# Patient Record
Sex: MALE | Race: WHITE | URBAN - METROPOLITAN AREA
[De-identification: names, ages, dates, MRNs, and addresses within clinical notes are randomized per-mention and may not be internally consistent; named-entity substitution may affect disease eponyms.]

---

## 2015-09-08 LAB
CREAT SERPL-MCNC: 0.82 MG/DL (ref 0.66–1.25)
GFR SERPL CREATININE-BSD FRML MDRD: >60 ML/MIN/1.73M2
GLUCOSE SERPL-MCNC: 96 MG/DL (ref 70–180)
POTASSIUM SERPL-SCNC: 3.8 MMOL/L (ref 3.5–5.3)

## 2017-02-22 ENCOUNTER — TRANSFERRED RECORDS (OUTPATIENT)
Dept: HEALTH INFORMATION MANAGEMENT | Facility: CLINIC | Age: 37
End: 2017-02-22

## 2017-07-13 ENCOUNTER — OFFICE VISIT (OUTPATIENT)
Dept: FAMILY MEDICINE | Facility: CLINIC | Age: 37
End: 2017-07-13
Payer: COMMERCIAL

## 2017-07-13 VITALS
HEART RATE: 68 BPM | OXYGEN SATURATION: 100 % | HEIGHT: 69 IN | WEIGHT: 167 LBS | BODY MASS INDEX: 24.73 KG/M2 | TEMPERATURE: 98.2 F | DIASTOLIC BLOOD PRESSURE: 80 MMHG | SYSTOLIC BLOOD PRESSURE: 123 MMHG

## 2017-07-13 DIAGNOSIS — M25.572 LEFT ANKLE PAIN, UNSPECIFIED CHRONICITY: ICD-10-CM

## 2017-07-13 DIAGNOSIS — R22.9 SKIN LUMPS: Primary | ICD-10-CM

## 2017-07-13 PROCEDURE — 99213 OFFICE O/P EST LOW 20 MIN: CPT | Performed by: FAMILY MEDICINE

## 2017-07-13 NOTE — PATIENT INSTRUCTIONS
1. Home exercises as we discussed.    2. 600-800 mg of Ibuprofen about one hour before exercise.    3. If not better in 4-6 weeks, set up physical therapy - [phone].    4. If still not better 4 weeks later, set up podiatry appointment - [phone].    5. I think the skin lumps are lipomas - if they cause symptoms, get bigger let me know. If you are worried - set up a 40 min appointment to remove one of them.    6. Send me copies of your blood work from your employer.    7. If all is fine, see you in one year for a physical.

## 2017-07-13 NOTE — MR AVS SNAPSHOT
After Visit Summary   7/13/2017    Niles Payne    MRN: [id number]           Patient Information     Date Of Birth          1980        Visit Information        Provider Department      7/13/2017 12:10 PM Alfred Chen MD [facility]        Today's Diagnoses     Skin lumps    -  1    Left ankle pain, unspecified chronicity          Care Instructions    1. Home exercises as we discussed.    2. 600-800 mg of Ibuprofen about one hour before exercise.    3. If not better in 4-6 weeks, set up physical therapy - [phone].    4. If still not better 4 weeks later, set up podiatry appointment - [phone].    5. I think the skin lumps are lipomas - if they cause symptoms, get bigger let me know. If you are worried - set up a 40 min appointment to remove one of them.    6. Send me copies of your blood work from your employer.    7. If all is fine, see you in one year for a physical.          Follow-ups after your visit        Additional Services     [facility] PT, HAND, AND CHIROPRACTIC REFERRAL       === This order will print in the [facility] Scheduling  Office ===    Physical therapy, hand therapy and chiropractic care are available through:    [facility]  [facility]    Call one easy number to schedule at any of the above locations:  [phone].    Your provider has referred you to Physical Therapy at [facility] or [facility]    Indication/Reason for Referral: Ankle Pain  Onset of Illness:  Feb 2017  Therapy Orders:  Evaluate and Treat  Special Programs:  None  Special Request:  None    Additional Comments for the therapist or chiropractor:  Please don't call him - he will call instead.    Please be aware that coverage of these services is subject to the terms and limitations of your health insurance plan.  Call member services at your health plan with any benefit or coverage questions.      Please bring the following to your  "appointment:    >>   Your personal calendar for scheduling future appointments  >>   Comfortable clothing            PODIATRY/FOOT & ANKLE SURGERY REFERRAL       Your provider has referred you to: [facility]: [facility] [phone]   [email or web address]    Please be aware that coverage of these services is subject to the terms and limitations of your health insurance plan.  Call member services at your health plan with any benefit or coverage questions.      Please bring the following to your appointment:  >>   Any x-rays, CTs or MRIs which have been performed.  Contact the facility where they were done to arrange for  prior to your scheduled appointment.    >>   List of current medications   >>   This referral request   >>   Any documents/labs given to you for this referral                  Who to contact     If you have questions or need follow up information about today's clinic visit or your schedule please contact [facility] directly at [phone].  Normal or non-critical lab and imaging results will be communicated to you by MyChart, letter or phone within 4 business days after the clinic has received the results. If you do not hear from us within 7 days, please contact the clinic through [employer] or phone. If you have a critical or abnormal lab result, we will notify you by phone as soon as possible.  Submit refill requests through [employer] or call your pharmacy and they will forward the refill request to us. Please allow 3 business days for your refill to be completed.          Additional Information About Your Visit        [employer] Information     [employer] lets you send messages to your doctor, view your test results, renew your prescriptions, schedule appointments and more. To sign up, go to [email or web address] . Click on \"Log in\" on the left side of the screen, which will take you to the Welcome page. Then click on \"Sign up Now\" on the right " "side of the page.     You will be asked to enter the access code listed below, as well as some personal information. Please follow the directions to create your username and password.     Your access code is: [email or web address]  Expires: 10/11/2017 12:48 PM     Your access code will  in 90 days. If you need help or a new code, please call your [place] clinic or [phone].        Care EveryWhere ID     This is your Care EveryWhere ID. This could be used by other organizations to access your [place] medical records  [id number]        Your Vitals Were     Pulse Temperature Height Pulse Oximetry BMI (Body Mass Index)       68 98.2  F (36.8  C) (Oral) 5' 9\" (1.753 m) 100% 24.66 kg/m2        Blood Pressure from Last 3 Encounters:   17 123/80   13 126/75   11 127/73    Weight from Last 3 Encounters:   17 167 lb (75.8 kg)   13 172 lb (78 kg)   04/26/10 172 lb (78 kg)              We Performed the Following     [facility] PT, HAND, AND CHIROPRACTIC REFERRAL     PODIATRY/FOOT & ANKLE SURGERY REFERRAL        Primary Care Provider    [facility] Other Clinic                Equal Access to Services     TEVIN BAZAN : Hadii anthony ku hadasho Soyinkaali, waaxda luqadaha, qaybta kaalmada adeegyada, waxay gabriella voss . So [facility] [phone].    ATENCIÓN: Si habla español, tiene a russell disposición servicios gratuitos de asistencia lingüística. Llame al [phone].    We comply with applicable federal civil rights laws and [place] laws. We do not discriminate on the basis of race, color, national origin, age, disability sex, sexual orientation or gender identity.            Thank you!     Thank you for [facility]  [facility]. Our goal is always to provide you with excellent care. Hearing back from our patients is one way we can continue to improve our services. Please take a few minutes to complete the written survey that you may receive in the mail after your visit with " us. Thank you!             Your Updated Medication List - Protect others around you: Learn how to safely use, store and throw away your medicines at www.disposemymeds.org.      Notice  As of 7/13/2017 12:48 PM    You have not been prescribed any medications.

## 2017-07-13 NOTE — NURSING NOTE
"Chief Complaint   Patient presents with     Establish Care     Fall     Musculoskeletal Problem     foot pain       Initial /80 (Cuff Size: Adult Regular)  Pulse 68  Temp 98.2  F (36.8  C) (Oral)  Ht 5' 9\" (1.753 m)  Wt 167 lb (75.8 kg)  SpO2 100%  BMI 24.66 kg/m2 Estimated body mass index is 24.66 kg/(m^2) as calculated from the following:    Height as of this encounter: 5' 9\" (1.753 m).    Weight as of this encounter: 167 lb (75.8 kg).  Medication Reconciliation: complete    Tari Prather LPN      "

## 2017-07-13 NOTE — PROGRESS NOTES
HPI:    I am seeing Niles Payne for the 1st time. he is a 36 year old male here to discuss:    Left ankle pain - Fell Feb 2017. Twisted left ankle. Went to ED with below xray. Now hurts 1st think in AM and gets better during the day. No swelling.   See instructions below.    XR Ankle Lt 3 Views2/22/2017  [facility]  Result Narrative   Clinical History: Sprain.        Radiologist:  MOUSTAPHA VICENTE M.D.    Technique: *XR ANKLE AP LATERAL OBLIQUE LEFT    Comparison: None.    Findings: Minimal irregularity at the distal aspect of the lateral   malleolus is likely due to old trauma. No evidence for acute fracture   or malalignment. The mortise joint is symmetric. Mild lateral soft   tissue swelling is noted.         Hodgkin lymphoma - age 18 treated and told it was cured. No further follow-up unless symptoms.    Lipomatosis - he says he lost weight recently and noticed lumps around his lower abdomen for a long time. He thinks they may be getting bigger.   He can just observe these. If worried we can remove one and send to pathology. He may also request to see surgeon.    Previous knee pain - resolved with PT.      XR Knees Bilat AP W Rt/Lt Lat/Sun/PA2/22/2017  [facility]  Result Narrative   Clinical History: Pain.        Radiologist:  MOUSTAPHA VICENTE M.D.    Technique: XR KNEE AP LATERAL PATELLA TUNNEL BILATERAL    Comparison: None.    Findings:   Right: No fracture or subluxation. There is normal joint spacing and   alignment. No knee joint effusion. No osseous lesion is identified.   No soft tissue abnormality is evident.    Left: No fracture or subluxation. There is normal joint spacing and   alignment. No knee joint effusion. No osseous lesion is identified.   No soft tissue abnormality is evident.    Impression: Unremarkable bilateral knee examination.     Preventive - he says he had some labs done at work. I asked him to send copy.    ROS:    Const: No fevers, weight changes or night sweats  "recently.  ENT: No runny nose, sore throat or ear pain.  Resp: No cough or shortness of breath.  CV: No chest pain, dizziness or cardiac palpitations.  GI: No nausea, vomiting, diarrhea or constipation. Denies blood in stools or black stools.  : No dysuria, frequency or hematuria.    Exam:    /80 (Cuff Size: Adult Regular)  Pulse 68  Temp 98.2  F (36.8  C) (Oral)  Ht 5' 9\" (1.753 m)  Wt 167 lb (75.8 kg)  SpO2 100%  BMI 24.66 kg/m2    Gen: Healthy appearing male in no acute distress  Neck: No enlarged lymph nodes, thyromegally or other masses.  Lungs: Good air movement and otherwise clear.  CV: Heart RRR with no murmurs. No JVD, carotid bruits or leg edema.  Skin: about 1/2 dozen SQ lumps, soft, 1 cm or smaller on the abd.    Assessment and Plan - Decision Making    1. Skin lumps  Per HPI    2. Left ankle pain, unspecified chronicity  Per HPI  - [facility] PT, HAND, AND CHIROPRACTIC REFERRAL  - PODIATRY/FOOT & ANKLE SURGERY REFERRAL      Written instructions given as follows:    Patient Instructions   1. Home exercises as we discussed.    2. 600-800 mg of Ibuprofen about one hour before exercise.    3. If not better in 4-6 weeks, set up physical therapy - [phone].    4. If still not better 4 weeks later, set up podiatry appointment - [phone].    5. I think the skin lumps are lipomas - if they cause symptoms, get bigger let me know. If you are worried - set up a 40 min appointment to remove one of them.    6. Send me copies of your blood work from your employer.    7. If all is fine, see you in one year for a physical.      "